# Patient Record
(demographics unavailable — no encounter records)

---

## 2025-01-24 NOTE — HISTORY OF PRESENT ILLNESS
[de-identified] :  KATTY OROZCO is a 62 year old patient here for consultation for mucocele removal. He reports he first noticed this about 2 months ago on his right cheek. He denies biting it or any trauma to the area. Denies pain, bleeding, or infection. Denies smoking history. He is not on any anticoagulants.

## 2025-01-24 NOTE — PHYSICAL EXAM
[TextEntry] : PHYSICAL EXAM   General: The patient was alert, oriented and in no distress. Voice was clear.   Face: The patient had no facial asymmetry or mass. The skin was unremarkable.   Ears: Hearing normal to conversational voice External ears were normal without deformity. External ear canals: AD- normal. no cerumen or inflammation. AS- cerumen impaction Tympanic membranes: AD- intact. no perforation or effusion  PROCEDURE- EAR MICROSCOPY AND CERUMEN REMOVAL from left ear  Indication: cerumen removal Under the microscope, obstructing cerumen was removed atraumatically from the left ear with a suction, curette and/or forceps. Canal: normal. no inflammation. Tympanic membrane: Intact. no perforation or effusion.   Nose: The external nose had no significant deformity.  There was no facial tenderness. On anterior rhinoscopy, the nasal mucosa was normal. The anterior septum was grossly midline. There were no visualized polyps, purulence  or masses.   Oral cavity: Oral mucosa- normal. Oral and base of tongue- clear and without mass. Gingival and buccal mucosa- moist. There is a 2-3mm round firm nodule on mid buccal mucosa on the right. No tenderness or erythema.  not located near parotid gland duct opening. Palate- the palate moved well. There was no cleft palate. There appeared to be good salivary flow.  Oral cavity/oropharynx- no pus, erythema or mass   Neck: The neck was symmetrical. The parotid and submandibular glands were normal without masses. The trachea was midline and there was no unusual crepitus. Thyroid was smooth and nontender and no masses were palpated. No masses   Lymphatics: Cervical adenopathy- none.

## 2025-01-24 NOTE — ASSESSMENT
[FreeTextEntry1] : He had a right buccal mucosal lesion which appeared benign and possibly a cyst or fibroma.  He underwent excision of the lesion to rule out malignancy.  Plan -Findings and management options were discussed with the patient. - I am placing him on a course of antibiotics - Dilute peroxide or salt water gargles twice daily - He should avoid foods that can cause irritation such as spicy foods or citrus - He was asked to call for the biopsy results - I will see him back in 1 to 2 weeks if he wishes to check the area - He was asked to call if there is any bleeding, signs of infection, or any other concerns.

## 2025-01-24 NOTE — CONSULT LETTER
[Dear  ___] : Dear  [unfilled], [Consult Letter:] : I had the pleasure of evaluating your patient, [unfilled]. [Please see my note below.] : Please see my note below. [Consult Closing:] : Thank you very much for allowing me to participate in the care of this patient.  If you have any questions, please do not hesitate to contact me. [Sincerely,] : Sincerely, [FreeTextEntry3] : Dianna Vicente MD [facility] at [facility] Otolaryngology  Head & Neck Surgery [facility]   Department of Otolaryngology [facility] [employer] at [facility]  Office Tel: [phone]